# Patient Record
Sex: MALE
[De-identification: names, ages, dates, MRNs, and addresses within clinical notes are randomized per-mention and may not be internally consistent; named-entity substitution may affect disease eponyms.]

---

## 2021-04-24 ENCOUNTER — HOSPITAL ENCOUNTER (EMERGENCY)
Dept: HOSPITAL 5 - ED | Age: 56
LOS: 1 days | End: 2021-04-25
Payer: MEDICARE

## 2021-04-24 DIAGNOSIS — Z20.822: ICD-10-CM

## 2021-04-24 DIAGNOSIS — I46.9: Primary | ICD-10-CM

## 2021-04-24 PROCEDURE — 31500 INSERT EMERGENCY AIRWAY: CPT

## 2021-04-24 PROCEDURE — 99285 EMERGENCY DEPT VISIT HI MDM: CPT

## 2021-04-24 NOTE — EMERGENCY DEPARTMENT REPORT
ED CPR HPI





- General


Chief Complaint: Cardiac Arrest/CPR


Stated Complaint: CARDIAC


Time Seen by Provider: 21 23:32


Source: family, EMS (Verbal report received from emergency medical services.  

EMS documentation not available at time of chart dictation ), RN notes reviewed


Mode of arrival: Stretcher


Limitations: Altered Mental Status, Physical Limitation





- History of Present Illness


Initial Comments: 





The patient was evaluated in the emergency department for symptoms described in 

the history of present illness.  He/she was evaluated in the context of the 

global COVID-19 pandemic, which necessitated consideration that the patient 

might be at risk for infection with the virus that causes COVID-19.  

Institutional protocols and algorithms that pertain to the evaluation of 

patients at risk for COVID-19 are in a state of rapid change based on 

information released by regulatory bodies including the CDC and federal and 

state organizations.  These policies and algorithms were followed during the 

patient's care in the emergency department.  Please note that these policies, 

procedures and recommendations changed on a rapid basis.





During the entire history and physical examination, I had on complete personal 

protective equipment.





The patient is a 55-year-old gentleman.  He has a history of developmental 

delay.  He is brought to the hospital with emergency medical services, with an 

EMS articulated complaint of weakness, fever, hypoxia, altered mental status.  

EMS provided bag-valve-mask ventilation to the patient in the field, and placed 

an oral airway.  They report he was saturating in 50% in the field.  He also 

report that he was bradycardic, with a heart rate of 45 bpm, and hypotensive, 

with a blood pressure in the 80s.  They report that they gave the patient 

atropine, 0.5 mg.





Upon arrival to this emergency room, the patient is receiving bag-valve-mask 

ventilation, with an oral airway in place.  Pulses are not appreciated.





CPR is initiated, standard ACLS interventions are performed.





Patient intubated by myself using a video laryngoscope, without need for 

induction agent or paralytic, with 1 attempt and no difficulty.





In spite of vigorous resuscitation and ACLS care, pulses are not obtained.





The patient does not have a shockable rhythm.





Multiple bedside transthoracic echocardiogram showed no coordinated ventricular 

activity.





After prolonged resuscitation, efforts were terminated secondary to medical 

futility.





Patient's family member is subsequently informed.








MD Complaint: other


-: hour(s)


Place: home


Bystander CPR Performed: No


AED Applied by Bystander/: No


Initial Findings in the Field: lethargic, agonal, other rhythm (Sinus rhythm, 

bradycardia, hypotensive)


ROSC in the Field: No


Associated Symptoms: shortness of breath, other (Weakness, confusion, fever)


Treatments Prior to Arrival: BMV, other airway device (Oral airway)





ED Review of Systems


ROS: 


Stated complaint: CARDIAC


Other details as noted in HPI





Comment: Unobtainable due to pts medical conditions (Review of systems obtained

from EMS and from family)


Constitutional: fever


Respiratory: shortness of breath


Neurological: confusion





ED Physical Exam





- General


Limitations: Altered Mental Status, Physical Limitation


General appearance: obtunded, other (GCS of 3)





- Head


Head exam: Present: atraumatic, normocephalic





- Eye


Eye exam: Present: normal appearance, conjunctival injection, other (Pupils 

dilated, do not react to light.)





- ENT


ENT exam: Present: normal orophraynx, mucous membranes moist





- Neck


Neck exam: Present: normal inspection.  Absent: tenderness, meningismus





- Respiratory


Respiratory exam: Absent: normal lung sounds bilaterally (The patient is not 

breathing spontaneously)





- Cardiovascular


Cardiovascular Exam: Present: other (The patient is pulseless).  Absent: 

systolic murmur, diastolic murmur, rubs, gallop





- GI/Abdominal


GI/Abdominal exam: Present: soft





- Rectal


Rectal exam: Present: deferred





- Extremities Exam


Extremities exam: Present: normal inspection, pedal edema





- Back Exam


Back exam: Present: normal inspection





- Neurological Exam


Neurological exam: Present: altered, other (GCS of 3)





- Psychiatric


Psychiatric exam: Present: other (Patient is nonverbal)





- Skin


Skin exam: Present: warm, dry, intact, normal color.  Absent: rash





- Intubation


Time Out Performed: No (Emergency situation)


Sedative: none (Not needed)


Laryngoscope: fiberoptic video scope


Size: 4 (Curved S4)


Assist Device Used: fiberoptic device


ET Tube Size: 7.5


Tube Secured Depth (cm): 24


Tube Secured Location: teeth


Tube Placement Confirmation: visualized tube passing t, confirmation by 

capnometr


Patient Tolerated Procedure: well


Intubation Complications: none





ED Medical Decision Making





- Medical Decision Making





Differential diagnosis, including but not limited to: Bacteremia, viremia, 

intracranial hemorrhage, COVID-19








Critical care attestation.: 


If time is entered above; I have spent that time in minutes in the direct care 

of this critically ill patient, excluding procedure time.








ED Disposition


Clinical Impression: 


 Suspected 2019 novel coronavirus infection, Cardiac arrest





Disposition: DC-20 


Is pt being admited?: No


Does the pt Need Aspirin: No


Condition: Undetermined


Referrals: 


PRIMARY CARE,MD [Primary Care Provider] - 3-5 Days